# Patient Record
Sex: FEMALE | Race: WHITE | Employment: FULL TIME | URBAN - METROPOLITAN AREA
[De-identification: names, ages, dates, MRNs, and addresses within clinical notes are randomized per-mention and may not be internally consistent; named-entity substitution may affect disease eponyms.]

---

## 2018-11-08 ENCOUNTER — TELEPHONE (OUTPATIENT)
Dept: ONCOLOGY | Age: 53
End: 2018-11-08

## 2018-11-08 NOTE — TELEPHONE ENCOUNTER
New referral received via fax for Dr. July Michelle from Dr. Ilda Stiles (). Pt scheduled 11/26/18 @4p with Dr. July Michelle for consult. Pt insurance [employer] ([employer]) accepted per reg. Records scanned.

## 2018-11-26 ENCOUNTER — INITIAL CONSULT (OUTPATIENT)
Dept: ONCOLOGY | Age: 53
End: 2018-11-26
Payer: COMMERCIAL

## 2018-11-26 VITALS
WEIGHT: 183.13 LBS | BODY MASS INDEX: 36.92 KG/M2 | HEIGHT: 59 IN | TEMPERATURE: 98.2 F | HEART RATE: 104 BPM | SYSTOLIC BLOOD PRESSURE: 141 MMHG | DIASTOLIC BLOOD PRESSURE: 79 MMHG

## 2018-11-26 DIAGNOSIS — D89.2 PARAPROTEINEMIA: Primary | ICD-10-CM

## 2018-11-26 DIAGNOSIS — N18.30 ANEMIA OF CHRONIC RENAL FAILURE, STAGE 3 (MODERATE) (HCC): ICD-10-CM

## 2018-11-26 DIAGNOSIS — D63.1 ANEMIA OF CHRONIC RENAL FAILURE, STAGE 3 (MODERATE) (HCC): ICD-10-CM

## 2018-11-26 DIAGNOSIS — D47.2 MONOCLONAL GAMMOPATHY: ICD-10-CM

## 2018-11-26 PROCEDURE — 99245 OFF/OP CONSLTJ NEW/EST HI 55: CPT | Performed by: INTERNAL MEDICINE

## 2018-11-26 RX ORDER — SIMVASTATIN 20 MG
20 TABLET ORAL DAILY
COMMUNITY
Start: 2018-08-02

## 2018-11-26 RX ORDER — OMEPRAZOLE 20 MG/1
20 CAPSULE, DELAYED RELEASE ORAL DAILY
COMMUNITY
End: 2021-10-01 | Stop reason: ALTCHOICE

## 2018-11-26 RX ORDER — AMLODIPINE BESYLATE 5 MG/1
5 TABLET ORAL DAILY
Refills: 4 | COMMUNITY
Start: 2018-11-05 | End: 2018-12-10 | Stop reason: ALTCHOICE

## 2018-11-26 NOTE — LETTER
thrombocytopenia we recommend bone marrow test.  At the time of the procedure we will do further workup also including liver function and vitamin B12 and folate and iron studies. Patient may benefit from treatment with Aranesp for anemia of chronic renal insufficiency once other causes of anemia ruled out. Patient's questions were answered to the best of her satisfaction and she verbalized full understanding and agreement. If you have questions, please do not hesitate to call me. I look forward to following Che Guerra along with you. Sincerely,    Pedro Santizo MD  Cell: [phone]    This note is created with the assistance of a speech recognition program.  While intending to generate a document that actually reflects the content of the visit, the document can still have some errors including those of syntax and sound a like substitutions which may escape proof reading. It such instances, actual meaning can be extrapolated by contextual diversion.

## 2018-11-26 NOTE — PROGRESS NOTES
studies. Patient may benefit from treatment with Aranesp for anemia of chronic renal insufficiency once other causes of anemia ruled out. Patient's questions were answered to the best of her satisfaction and she verbalized full understanding and agreement.

## 2018-12-03 ENCOUNTER — HOSPITAL ENCOUNTER (OUTPATIENT)
Age: 53
Discharge: HOME | End: 2018-12-03
Payer: COMMERCIAL

## 2018-12-03 ENCOUNTER — OFFICE VISIT (OUTPATIENT)
Dept: ONCOLOGY | Age: 53
End: 2018-12-03
Payer: COMMERCIAL

## 2018-12-03 VITALS
WEIGHT: 184 LBS | SYSTOLIC BLOOD PRESSURE: 133 MMHG | TEMPERATURE: 97.7 F | BODY MASS INDEX: 37.16 KG/M2 | HEART RATE: 83 BPM | DIASTOLIC BLOOD PRESSURE: 91 MMHG

## 2018-12-03 DIAGNOSIS — N18.30 ANEMIA OF CHRONIC RENAL FAILURE, STAGE 3 (MODERATE) (HCC): ICD-10-CM

## 2018-12-03 DIAGNOSIS — D63.1 ANEMIA OF CHRONIC RENAL FAILURE, STAGE 3 (MODERATE) (HCC): ICD-10-CM

## 2018-12-03 DIAGNOSIS — D89.2 PARAPROTEINEMIA: ICD-10-CM

## 2018-12-03 DIAGNOSIS — D63.1 ANEMIA OF CHRONIC RENAL FAILURE, STAGE 3 (MODERATE) (HCC): Primary | ICD-10-CM

## 2018-12-03 DIAGNOSIS — N18.30 ANEMIA OF CHRONIC RENAL FAILURE, STAGE 3 (MODERATE) (HCC): Primary | ICD-10-CM

## 2018-12-03 DIAGNOSIS — D89.2 PARAPROTEINEMIA: Primary | ICD-10-CM

## 2018-12-03 LAB
ABSOLUTE EOS #: 0.1 K/UL (ref 0–0.4)
ABSOLUTE IMMATURE GRANULOCYTE: ABNORMAL K/UL (ref 0–0.3)
ABSOLUTE LYMPH #: 1.4 K/UL (ref 1–4.8)
ABSOLUTE MONO #: 0.4 K/UL (ref 0.1–1.2)
ABSOLUTE RETIC #: 0.06 M/UL (ref 0.03–0.08)
ALBUMIN SERPL-MCNC: 4.3 G/DL (ref 3.5–5.2)
ALBUMIN/GLOBULIN RATIO: 1.3 (ref 1–2.5)
ALP BLD-CCNC: 70 U/L (ref 35–104)
ALT SERPL-CCNC: 21 U/L (ref 5–33)
AST SERPL-CCNC: 16 U/L
BASOPHILS # BLD: 1 % (ref 0–2)
BASOPHILS ABSOLUTE: 0.1 K/UL (ref 0–0.2)
BILIRUB SERPL-MCNC: 0.18 MG/DL (ref 0.3–1.2)
BILIRUBIN DIRECT: <0.08 MG/DL
BILIRUBIN, INDIRECT: ABNORMAL MG/DL (ref 0–1)
DIFFERENTIAL TYPE: ABNORMAL
EOSINOPHILS RELATIVE PERCENT: 1 % (ref 1–4)
FERRITIN: 96 UG/L (ref 13–150)
FOLATE: 12.3 NG/ML
GLOBULIN: ABNORMAL G/DL (ref 1.5–3.8)
HCT VFR BLD CALC: 30.7 % (ref 36–46)
HEMOGLOBIN: 10.3 G/DL (ref 12–16)
IMMATURE GRANULOCYTES: ABNORMAL %
IMMATURE RETIC FRACT: 11.6 % (ref 2.7–18.3)
IRON SATURATION: 27 % (ref 20–55)
IRON: 80 UG/DL (ref 37–145)
LYMPHOCYTES # BLD: 22 % (ref 24–44)
MCH RBC QN AUTO: 32.6 PG (ref 26–34)
MCHC RBC AUTO-ENTMCNC: 33.6 G/DL (ref 31–37)
MCV RBC AUTO: 97 FL (ref 80–100)
MONOCYTES # BLD: 6 % (ref 2–11)
NRBC AUTOMATED: ABNORMAL PER 100 WBC
PDW BLD-RTO: 13 % (ref 12.5–15.4)
PLATELET # BLD: 271 K/UL (ref 140–450)
PLATELET ESTIMATE: ABNORMAL
PMV BLD AUTO: 7.6 FL (ref 6–12)
RBC # BLD: 3.16 M/UL (ref 4–5.2)
RBC # BLD: ABNORMAL 10*6/UL
RETIC %: 1.8 % (ref 0.5–1.9)
RETIC HEMOGLOBIN: 35 PG (ref 28.2–35.7)
SEG NEUTROPHILS: 70 % (ref 36–66)
SEGMENTED NEUTROPHILS ABSOLUTE COUNT: 4.6 K/UL (ref 1.8–7.7)
TOTAL IRON BINDING CAPACITY: 292 UG/DL (ref 250–450)
TOTAL PROTEIN: 7.7 G/DL (ref 6.4–8.3)
UNSATURATED IRON BINDING CAPACITY: 212 UG/DL (ref 112–347)
VITAMIN B-12: 741 PG/ML (ref 232–1245)
WBC # BLD: 6.6 K/UL (ref 3.5–11)
WBC # BLD: ABNORMAL 10*3/UL

## 2018-12-03 PROCEDURE — 85025 COMPLETE CBC W/AUTO DIFF WBC: CPT

## 2018-12-03 PROCEDURE — 88185 FLOWCYTOMETRY/TC ADD-ON: CPT

## 2018-12-03 PROCEDURE — 82728 ASSAY OF FERRITIN: CPT

## 2018-12-03 PROCEDURE — 38221 DX BONE MARROW BIOPSIES: CPT | Performed by: INTERNAL MEDICINE

## 2018-12-03 PROCEDURE — 83540 ASSAY OF IRON: CPT

## 2018-12-03 PROCEDURE — 38222 DX BONE MARROW BX & ASPIR: CPT | Performed by: INTERNAL MEDICINE

## 2018-12-03 PROCEDURE — 88184 FLOWCYTOMETRY/ TC 1 MARKER: CPT

## 2018-12-03 PROCEDURE — 85045 AUTOMATED RETICULOCYTE COUNT: CPT

## 2018-12-03 PROCEDURE — 88311 DECALCIFY TISSUE: CPT

## 2018-12-03 PROCEDURE — 36415 COLL VENOUS BLD VENIPUNCTURE: CPT

## 2018-12-03 PROCEDURE — 88280 CHROMOSOME KARYOTYPE STUDY: CPT

## 2018-12-03 PROCEDURE — 82607 VITAMIN B-12: CPT

## 2018-12-03 PROCEDURE — 88237 TISSUE CULTURE BONE MARROW: CPT

## 2018-12-03 PROCEDURE — 88360 TUMOR IMMUNOHISTOCHEM/MANUAL: CPT

## 2018-12-03 PROCEDURE — 88264 CHROMOSOME ANALYSIS 20-25: CPT

## 2018-12-03 PROCEDURE — 88305 TISSUE EXAM BY PATHOLOGIST: CPT

## 2018-12-03 PROCEDURE — 83550 IRON BINDING TEST: CPT

## 2018-12-03 PROCEDURE — 80076 HEPATIC FUNCTION PANEL: CPT

## 2018-12-03 PROCEDURE — 88313 SPECIAL STAINS GROUP 2: CPT

## 2018-12-03 PROCEDURE — 82746 ASSAY OF FOLIC ACID SERUM: CPT

## 2018-12-06 LAB — BONE MARROW REPORT: NORMAL

## 2018-12-07 LAB
CHROMOSOME STUDY: NORMAL
FLOW CYTOMETRY, BM: NORMAL

## 2018-12-10 ENCOUNTER — OFFICE VISIT (OUTPATIENT)
Dept: ONCOLOGY | Age: 53
End: 2018-12-10
Payer: COMMERCIAL

## 2018-12-10 VITALS
SYSTOLIC BLOOD PRESSURE: 144 MMHG | DIASTOLIC BLOOD PRESSURE: 92 MMHG | TEMPERATURE: 97.7 F | WEIGHT: 183.44 LBS | BODY MASS INDEX: 37.05 KG/M2 | HEART RATE: 95 BPM

## 2018-12-10 DIAGNOSIS — D89.2 PARAPROTEINEMIA: Primary | ICD-10-CM

## 2018-12-10 DIAGNOSIS — D63.1 ANEMIA OF CHRONIC RENAL FAILURE, STAGE 3 (MODERATE) (HCC): ICD-10-CM

## 2018-12-10 DIAGNOSIS — N18.30 ANEMIA OF CHRONIC RENAL FAILURE, STAGE 3 (MODERATE) (HCC): ICD-10-CM

## 2018-12-10 PROCEDURE — 99214 OFFICE O/P EST MOD 30 MIN: CPT | Performed by: INTERNAL MEDICINE

## 2018-12-10 PROCEDURE — 99211 OFF/OP EST MAY X REQ PHY/QHP: CPT | Performed by: INTERNAL MEDICINE

## 2018-12-10 RX ORDER — HYDROCHLOROTHIAZIDE 25 MG/1
25 TABLET ORAL DAILY
Refills: 1 | COMMUNITY
Start: 2018-12-03 | End: 2020-08-21

## 2018-12-11 NOTE — PROGRESS NOTES
Otherwise negative for any hematological or oncological conditions. SOCIAL HISTORY:  reports that she has quit smoking. She has never used smokeless tobacco. She reports that she does not drink alcohol or use drugs. REVIEW OF SYSTEMS:     · General: No weakness or fatigue. No unanticipated weight loss or decreased appetite. No fever or chills. · Eyes: No blurred vision, eye pain or double vision. · Ears: No hearing problems or drainage. No tinnitus. · Throat: No sore throat, problems with swallowing or dysphagia. · Respiratory: No cough, sputum or hemoptysis. No shortness of breath. No pleuritic chest pain. · Cardiovascular: No chest pain, orthopnea or PND. No lower extremity edema. No palpitation. · Gastrointestinal: No problems with swallowing. No abdominal pain or bloating. No nausea or vomiting. No diarrhea or constipation. No GI bleeding. · Genitourinary: No dysuria, hematuria, frequency or urgency. · Musculoskeletal: No muscle aches or pains. No limitation of movement. No back pain. No gait disturbance, No joint complaints. · Dermatologic: No skin rashes or pruritus. No skin lesions or discolorations. · Psychiatric: No depression, anxiety, or stress or signs of schizophrenia. No change in mood or affect. · Hematologic: No history of bleeding tendency. No bruises or ecchymosis. No history of clotting problems. · Infectious disease: No fever, chills or frequent infections. · Endocrine: No problems with opacity. No polydipsia or polyuria. No temperature intolerance. · Neurologic: No headaches or dizziness. No weakness or numbness of the extremities. No changes in balance, coordination,  memory, mentation, behavior. · Allergic/Immunologic: No nasal congestion or hives. No repeated infections. PHYSICAL EXAM:  The patient is not in acute distress. Vital signs: Blood pressure (!) 144/92, pulse 95, temperature 97.7 °F (36.5 °C), weight 183 lb 7 oz (83.2 kg).  HEENT:  Eyes

## 2019-06-18 ENCOUNTER — TELEPHONE (OUTPATIENT)
Dept: ONCOLOGY | Age: 54
End: 2019-06-18

## 2019-08-06 ENCOUNTER — TELEPHONE (OUTPATIENT)
Dept: ONCOLOGY | Age: 54
End: 2019-08-06

## 2019-08-09 ENCOUNTER — TELEPHONE (OUTPATIENT)
Dept: ONCOLOGY | Age: 54
End: 2019-08-09

## 2019-08-09 ENCOUNTER — OFFICE VISIT (OUTPATIENT)
Dept: ONCOLOGY | Age: 54
End: 2019-08-09
Payer: COMMERCIAL

## 2019-08-09 VITALS
TEMPERATURE: 98.6 F | HEART RATE: 118 BPM | WEIGHT: 199 LBS | DIASTOLIC BLOOD PRESSURE: 73 MMHG | SYSTOLIC BLOOD PRESSURE: 132 MMHG | BODY MASS INDEX: 40.19 KG/M2

## 2019-08-09 DIAGNOSIS — N18.30 ANEMIA OF CHRONIC RENAL FAILURE, STAGE 3 (MODERATE) (HCC): ICD-10-CM

## 2019-08-09 DIAGNOSIS — D89.2 PARAPROTEINEMIA: Primary | ICD-10-CM

## 2019-08-09 DIAGNOSIS — D63.1 ANEMIA OF CHRONIC RENAL FAILURE, STAGE 3 (MODERATE) (HCC): ICD-10-CM

## 2019-08-09 PROCEDURE — 99214 OFFICE O/P EST MOD 30 MIN: CPT | Performed by: INTERNAL MEDICINE

## 2019-08-09 PROCEDURE — 99211 OFF/OP EST MAY X REQ PHY/QHP: CPT | Performed by: INTERNAL MEDICINE

## 2019-08-09 RX ORDER — SODIUM BICARBONATE 325 MG/1
325 TABLET ORAL 2 TIMES DAILY
Refills: 2 | COMMUNITY
Start: 2019-07-31

## 2019-08-09 RX ORDER — AMLODIPINE BESYLATE 5 MG/1
5 TABLET ORAL DAILY
COMMUNITY
Start: 2019-06-25 | End: 2020-02-17 | Stop reason: ALTCHOICE

## 2019-08-11 NOTE — PROGRESS NOTES
Diagnosis   Peripheral Blood:         Normocytic anemia, hypoproliferative. Bone Marrow Aspirate, Clot Section and Biopsy:         Maturing trilineage hematopoiesis, normal cellular bone marrow.         Adequate storage iron.         Plasma cells, monoclonal, 3% (lambda).         Diagnosis Comment     Electronic health record is reviewed.  Noted in the clinical   information is a faint IGA lambda paraprotein.  Quantitation is not   available within the EHR.  Recommend serum and urine protein   electrophrenic studies with immunofixation if not already performed   for quantitation of paraprotein.        IMPRESSION:   Currently Normocytic anemia  Past Chronic history of macrocytic anemia  Paraproteinemia, likely MGUS  Chronic renal insufficiency, stage IV      PLAN: All above were reviewed and discussed with the patient. I explained the significance of these abnormalities related to immunoglobulins. Bone marrow test was reviewed and discussed. MGUS. repeated labs showed stable immunoglobulins level. No significant changes. We recommended to continue monitoring for IgA and light chain immunoglobulins. Labs will be repeated in 6 months. Patient may benefit from treatment with Aranesp for anemia of chronic renal insufficiency for hemoglobin below 10. Patient's questions were answered to the best of her satisfaction and she verbalized full understanding and agreement.

## 2020-02-11 ENCOUNTER — TELEPHONE (OUTPATIENT)
Dept: ONCOLOGY | Age: 55
End: 2020-02-11

## 2020-02-17 ENCOUNTER — OFFICE VISIT (OUTPATIENT)
Dept: ONCOLOGY | Age: 55
End: 2020-02-17
Payer: COMMERCIAL

## 2020-02-17 ENCOUNTER — TELEPHONE (OUTPATIENT)
Dept: ONCOLOGY | Age: 55
End: 2020-02-17

## 2020-02-17 VITALS
WEIGHT: 199.2 LBS | TEMPERATURE: 97.7 F | DIASTOLIC BLOOD PRESSURE: 95 MMHG | HEART RATE: 108 BPM | SYSTOLIC BLOOD PRESSURE: 132 MMHG | BODY MASS INDEX: 40.23 KG/M2

## 2020-02-17 PROCEDURE — 99214 OFFICE O/P EST MOD 30 MIN: CPT | Performed by: INTERNAL MEDICINE

## 2020-02-17 PROCEDURE — 99211 OFF/OP EST MAY X REQ PHY/QHP: CPT | Performed by: INTERNAL MEDICINE

## 2020-02-17 RX ORDER — RAMIPRIL 2.5 MG/1
CAPSULE ORAL
COMMUNITY
Start: 2019-12-11 | End: 2021-10-01 | Stop reason: ALTCHOICE

## 2020-02-17 NOTE — TELEPHONE ENCOUNTER
Pt was seen today by Dr. Rm Duarte. Per AVS, pt is scheduled for f/u on 08-17-20/pt will have labs drawn at [address] one week prior (pt has orders).

## 2020-02-18 NOTE — PROGRESS NOTES
liver cancer. Otherwise negative for any hematological or oncological conditions. SOCIAL HISTORY:  reports that she has quit smoking. She has never used smokeless tobacco. She reports that she does not drink alcohol or use drugs. REVIEW OF SYSTEMS:     · General: No weakness or fatigue. No unanticipated weight loss or decreased appetite. No fever or chills. · Eyes: No blurred vision, eye pain or double vision. · Ears: No hearing problems or drainage. No tinnitus. · Throat: No sore throat, problems with swallowing or dysphagia. · Respiratory: No cough, sputum or hemoptysis. No shortness of breath. No pleuritic chest pain. · Cardiovascular: No chest pain, orthopnea or PND. No lower extremity edema. No palpitation. · Gastrointestinal: No problems with swallowing. No abdominal pain or bloating. No nausea or vomiting. No diarrhea or constipation. No GI bleeding. · Genitourinary: No dysuria, hematuria, frequency or urgency. · Musculoskeletal: No muscle aches or pains. No limitation of movement. No back pain. No gait disturbance, No joint complaints. · Dermatologic: No skin rashes or pruritus. No skin lesions or discolorations. · Psychiatric: No depression, anxiety, or stress or signs of schizophrenia. No change in mood or affect. · Hematologic: No history of bleeding tendency. No bruises or ecchymosis. No history of clotting problems. · Infectious disease: No fever, chills or frequent infections. · Endocrine: No problems with opacity. No polydipsia or polyuria. No temperature intolerance. · Neurologic: No headaches or dizziness. No weakness or numbness of the extremities. No changes in balance, coordination,  memory, mentation, behavior. · Allergic/Immunologic: No nasal congestion or hives. No repeated infections. PHYSICAL EXAM:  The patient is not in acute distress.   Vital signs: Blood pressure (!) 132/95, pulse 108, temperature 97.7 °F (36.5 °C), temperature source Oral, 2018:  Final Diagnosis   Peripheral Blood:         Normocytic anemia, hypoproliferative. Bone Marrow Aspirate, Clot Section and Biopsy:         Maturing trilineage hematopoiesis, normal cellular bone marrow.         Adequate storage iron.         Plasma cells, monoclonal, 3% (lambda).         Diagnosis Comment     Electronic health record is reviewed.  Noted in the clinical   information is a faint IGA lambda paraprotein.  Quantitation is not   available within the EHR.  Recommend serum and urine protein   electrophrenic studies with immunofixation if not already performed   for quantitation of paraprotein.        IMPRESSION:   Past Chronic history of macrocytic anemia  Paraproteinemia, likely MGUS  Chronic renal insufficiency, stage IV      PLAN: All above were reviewed and discussed with the patient. I explained the significance of these abnormalities related to immunoglobulins. Bone marrow test was reviewed and discussed. MGUS. repeated labs showed stable immunoglobulins level. No significant changes. We recommended to continue monitoring for IgA and light chain immunoglobulins. Labs will be repeated in 6 months. Patient may benefit from treatment with Aranesp for anemia of chronic renal insufficiency for hemoglobin below 10. Patient's questions were answered to the best of her satisfaction and she verbalized full understanding and agreement.

## 2020-08-10 ENCOUNTER — TELEPHONE (OUTPATIENT)
Dept: ONCOLOGY | Age: 55
End: 2020-08-10

## 2020-08-10 NOTE — TELEPHONE ENCOUNTER
Tried calling patient to see if she had her labs done for her appt with Jules Kellogg on 8/17. Only CBC and CMP were done. Patient needs more labs done besides those. Patient did not have have voicemail, will try calling patient again.

## 2020-08-17 ENCOUNTER — TELEPHONE (OUTPATIENT)
Dept: ONCOLOGY | Age: 55
End: 2020-08-17

## 2020-08-17 NOTE — LETTER
[address]  Phone: [phone]  Fax: [phone]    Cinda Justin MD        August 17, 2020     [address] [place], [place] [address] [place] [address]      Dear Cecil Gaspar: We are sorry that you missed your appointment with Dr. Jackie Naqvi on 8/17/2020. Your health and follow-up medical care are important to us. Please call our office as soon as possible so that we may reschedule your appointment. If you have already rescheduled your appointment, please disregard this letter.     Sincerely,        Cinda Justin MD

## 2020-08-21 ENCOUNTER — TELEPHONE (OUTPATIENT)
Dept: ONCOLOGY | Age: 55
End: 2020-08-21

## 2020-08-21 ENCOUNTER — OFFICE VISIT (OUTPATIENT)
Dept: ONCOLOGY | Age: 55
End: 2020-08-21
Payer: COMMERCIAL

## 2020-08-21 VITALS
DIASTOLIC BLOOD PRESSURE: 86 MMHG | BODY MASS INDEX: 38.56 KG/M2 | HEART RATE: 121 BPM | RESPIRATION RATE: 18 BRPM | WEIGHT: 190.9 LBS | TEMPERATURE: 97.3 F | SYSTOLIC BLOOD PRESSURE: 135 MMHG

## 2020-08-21 PROCEDURE — 99211 OFF/OP EST MAY X REQ PHY/QHP: CPT | Performed by: INTERNAL MEDICINE

## 2020-08-21 PROCEDURE — 99214 OFFICE O/P EST MOD 30 MIN: CPT | Performed by: INTERNAL MEDICINE

## 2020-08-21 NOTE — TELEPHONE ENCOUNTER
Pt was seen today by Dr. Kristina Patel. Per AVS, pt is scheduled for f/u on 02-. Pt will have labs drawn one week prior here at [facility].  Specific labs not stated on AVS.

## 2020-08-31 NOTE — PROGRESS NOTES
_           Chief Complaint   Patient presents with    Follow-up     review status of disease    Results     DIAGNOSIS:       Currently Normocytic anemia  Past Chronic history of macrocytic anemia  Paraproteinemia, likely MGUS  Chronic renal insufficiency, stage IV      CURRENT THERAPY:         Patient seen discuss results of the lab test and further lab work up  Close monitoring for MGUS    BRIEF CASE HISTORY:      Ms. Gracie Ann is a very pleasant 54 y.o. female with history of hyperlipidemia and hypertension diagnosed more than 15 years ago. Patient was also found to have chronic renal insufficiency. Kidney function was deteriorating. She was recently referred and evaluated by nephrology. Further workup for underlying etiology showed abnormal immunoglobulins levels. She is referred for further evaluation. Patient has no fever or night sweats. No back pain or bone aches. No weight loss or decreased appetite. No palpable lymph nodes. No history of liver disease. No other complaints. Patient quit smoking 25 years ago. He smoked about 10 years. Denies alcohol drinking. INTERIM HISTORY:   The patient seen for follow-up abnormal immunoglobulins levels related to MGUS based on Bone marrow test.  Clinically patient is stable. No weakness or fatigue. No fever or night sweats. No weight loss or decreased appetite. No palpable lymph nodes. PAST MEDICAL HISTORY: has a past medical history of Hyperlipidemia and Hypertension. PAST SURGICAL HISTORY: has a past surgical history that includes Bunionectomy (Bilateral). CURRENT MEDICATIONS:  has a current medication list which includes the following prescription(s): ramipril, sodium bicarbonate, simvastatin, and omeprazole. ALLERGIES:  is allergic to ampicillin. FAMILY HISTORY: Grandfather had lung cancer. Grandfather had liver cancer.   Otherwise negative for any hematological or oncological conditions. SOCIAL HISTORY:  reports that she has quit smoking. She has never used smokeless tobacco. She reports that she does not drink alcohol or use drugs. REVIEW OF SYSTEMS:     · General: No weakness or fatigue. No unanticipated weight loss or decreased appetite. No fever or chills. · Eyes: No blurred vision, eye pain or double vision. · Ears: No hearing problems or drainage. No tinnitus. · Throat: No sore throat, problems with swallowing or dysphagia. · Respiratory: No cough, sputum or hemoptysis. No shortness of breath. No pleuritic chest pain. · Cardiovascular: No chest pain, orthopnea or PND. No lower extremity edema. No palpitation. · Gastrointestinal: No problems with swallowing. No abdominal pain or bloating. No nausea or vomiting. No diarrhea or constipation. No GI bleeding. · Genitourinary: No dysuria, hematuria, frequency or urgency. · Musculoskeletal: No muscle aches or pains. No limitation of movement. No back pain. No gait disturbance, No joint complaints. · Dermatologic: No skin rashes or pruritus. No skin lesions or discolorations. · Psychiatric: No depression, anxiety, or stress or signs of schizophrenia. No change in mood or affect. · Hematologic: No history of bleeding tendency. No bruises or ecchymosis. No history of clotting problems. · Infectious disease: No fever, chills or frequent infections. · Endocrine: No problems with opacity. No polydipsia or polyuria. No temperature intolerance. · Neurologic: No headaches or dizziness. No weakness or numbness of the extremities. No changes in balance, coordination,  memory, mentation, behavior. · Allergic/Immunologic: No nasal congestion or hives. No repeated infections. PHYSICAL EXAM:  The patient is not in acute distress. Vital signs: Blood pressure 135/86, pulse 121, temperature 97.3 °F (36.3 °C), temperature source Oral, resp.  rate 18, weight 190 lb 14.4 oz (86.6 kg). HEENT:  Eyes are normal. Ears, nose and throat are normal.  Neck: Supple. No lymph node enlargement. No thyroid enlargement. Trachea is centrally located. Chest:  Clear to auscultation. No wheezes or crepitations. Heart: Regular sinus rhythm. Abdomen: Soft, nontender. No hepatosplenomegaly. No masses. Extremities:  With no edema. Lymph Nodes:  No cervical, axillary or inguinal lymph node enlargement. Neurologic:  Conscious and oriented. No focal neurological deficits. Psychosocial: No depression, anxiety or stress. Skin: No rashes, bruises or ecchymoses. Review of Diagnostic data:     CBC  Component Name    Hospital Encounter on 10/29/2018   [employer]\")' href=\"epic://request1. [id number]/\">10/29/2018   Hospital Encounter on 8/31/2018   [employer]\")' href=\"epic://request1. [id number]/\">0/26/9784   Hospital Encounter on 8/2/2018   [employer]\")' href=\"epic://request1. [id number]/\">8/2/2018 1/4/2013 12/5/2012 12/1/2012     7.9 9.1 6.9 2.7 (L) 2.5 (L) 3.1 (L)   3.01 (L) 3.09 (L) 3.37 (L) 2.87 (L) 2.99 (L) 2.90 (L)   9.9 (L) 10.8 (L) 11.8 10.9 (L) 11.1 (L) 10.7 (L)   30.0 (L) 32.0 (L) 35.2 30.9 (L) 32.1 (L) 31.3 (L)   100 104 (H) 104 (H) 108 (H) 107 (H) 108 (H)   33.0 35.1 (H) 34.9 (H) 37.9 (H) 37.0 (H) 37.0 (H)   33.2 33.9 33.4 35.2 34.4 34.2   12.8 13.4 13.5 13.9 14.1 13.7   263 273 268 123 (L) 149 (L) 161   9.6 9.1 9.4 8.1 7.7     IMMUNOGLOBULINS  Component Name    Hospital Encounter on 10/29/2018  [address] [place] [address]\")' href=\"epic://request1. [id number]/\">10/29/2018     540 (H)   66   1,036   5.54 (H)   11.43 (H)   0.48   SEE SEPARATE REPORT   IgA   IgM   IgG   Free Kappa Lt Chains   Free Lambda Lt Chains   Free verona/lambda ratio   Immune profile inter     Bone marrow test December 3, 2018:   Final Diagnosis

## 2021-03-15 ENCOUNTER — TELEPHONE (OUTPATIENT)
Dept: ONCOLOGY | Age: 56
End: 2021-03-15

## 2021-03-15 ENCOUNTER — OFFICE VISIT (OUTPATIENT)
Dept: ONCOLOGY | Age: 56
End: 2021-03-15
Payer: COMMERCIAL

## 2021-03-15 VITALS
TEMPERATURE: 98.4 F | DIASTOLIC BLOOD PRESSURE: 90 MMHG | WEIGHT: 193.9 LBS | RESPIRATION RATE: 16 BRPM | HEART RATE: 109 BPM | BODY MASS INDEX: 39.16 KG/M2 | SYSTOLIC BLOOD PRESSURE: 143 MMHG

## 2021-03-15 DIAGNOSIS — D47.2 MGUS (MONOCLONAL GAMMOPATHY OF UNKNOWN SIGNIFICANCE): Primary | ICD-10-CM

## 2021-03-15 PROCEDURE — 99211 OFF/OP EST MAY X REQ PHY/QHP: CPT | Performed by: INTERNAL MEDICINE

## 2021-03-15 PROCEDURE — 99214 OFFICE O/P EST MOD 30 MIN: CPT | Performed by: INTERNAL MEDICINE

## 2021-03-15 RX ORDER — ERGOCALCIFEROL 1.25 MG/1
50000 CAPSULE ORAL WEEKLY
COMMUNITY

## 2021-03-15 RX ORDER — BUMETANIDE 0.5 MG/1
1 TABLET ORAL DAILY
COMMUNITY
Start: 2021-03-03

## 2021-03-15 NOTE — TELEPHONE ENCOUNTER
AVS from 3/15/21     RV 6 months with labs before RV    rv scheduled for 9/27/21 @ 2:30pm  Pt will have labs drawn one week prior at [facility] (cbc,bmp,immunoglobulin panel,kappa/lamda,ifx)    Pt was given AVS and appt schedule

## 2021-03-15 NOTE — PROGRESS NOTES
_           Chief Complaint   Patient presents with    Follow-up     review status of disease    Nausea     today    Fatigue    Anorexia     with the nausea     DIAGNOSIS:       Currently Normocytic anemia  Past Chronic history of macrocytic anemia  Paraproteinemia, likely MGUS  Chronic renal insufficiency, stage IV      CURRENT THERAPY:         Patient seen discuss results of the lab test and further lab work up  Close monitoring for MGUS    BRIEF CASE HISTORY:      Ms. Ishmael Angel is a very pleasant 64 y.o. female with history of hyperlipidemia and hypertension diagnosed more than 15 years ago. Patient was also found to have chronic renal insufficiency. Kidney function was deteriorating. She was recently referred and evaluated by nephrology. Further workup for underlying etiology showed abnormal immunoglobulins levels. She is referred for further evaluation. Patient has no fever or night sweats. No back pain or bone aches. No weight loss or decreased appetite. No palpable lymph nodes. No history of liver disease. No other complaints. Patient quit smoking 25 years ago. He smoked about 10 years. Denies alcohol drinking. INTERIM HISTORY:   The patient seen for follow-up abnormal immunoglobulins levels related to MGUS based on Bone marrow test.  Clinically patient is stable. No weakness or fatigue. No fever or night sweats. No weight loss or decreased appetite. No palpable lymph nodes. PAST MEDICAL HISTORY: has a past medical history of Hyperlipidemia and Hypertension. PAST SURGICAL HISTORY: has a past surgical history that includes Bunionectomy (Bilateral). CURRENT MEDICATIONS:  has a current medication list which includes the following prescription(s): vitamin d, bumetanide, ramipril, sodium bicarbonate, simvastatin, and omeprazole. ALLERGIES:  is allergic to ampicillin.     FAMILY HISTORY: Grandfather had lung cancer. Grandfather had liver cancer. Otherwise negative for any hematological or oncological conditions. SOCIAL HISTORY:  reports that she has quit smoking. She has never used smokeless tobacco. She reports that she does not drink alcohol or use drugs. REVIEW OF SYSTEMS:     · General: No weakness or fatigue. No unanticipated weight loss or decreased appetite. No fever or chills. · Eyes: No blurred vision, eye pain or double vision. · Ears: No hearing problems or drainage. No tinnitus. · Throat: No sore throat, problems with swallowing or dysphagia. · Respiratory: No cough, sputum or hemoptysis. No shortness of breath. No pleuritic chest pain. · Cardiovascular: No chest pain, orthopnea or PND. No lower extremity edema. No palpitation. · Gastrointestinal: No problems with swallowing. No abdominal pain or bloating. No nausea or vomiting. No diarrhea or constipation. No GI bleeding. · Genitourinary: No dysuria, hematuria, frequency or urgency. · Musculoskeletal: No muscle aches or pains. No limitation of movement. No back pain. No gait disturbance, No joint complaints. · Dermatologic: No skin rashes or pruritus. No skin lesions or discolorations. · Psychiatric: No depression, anxiety, or stress or signs of schizophrenia. No change in mood or affect. · Hematologic: No history of bleeding tendency. No bruises or ecchymosis. No history of clotting problems. · Infectious disease: No fever, chills or frequent infections. · Endocrine: No problems with opacity. No polydipsia or polyuria. No temperature intolerance. · Neurologic: No headaches or dizziness. No weakness or numbness of the extremities. No changes in balance, coordination,  memory, mentation, behavior. · Allergic/Immunologic: No nasal congestion or hives. No repeated infections. PHYSICAL EXAM:  The patient is not in acute distress.   Vital signs: Blood pressure (!) 143/90, pulse 109, temperature 98.4 °F (36.9 °C), temperature source Oral, resp. rate 16, weight 193 lb 14.4 oz (88 kg). HEENT:  Eyes are normal. Ears, nose and throat are normal.  Neck: Supple. No lymph node enlargement. No thyroid enlargement. Trachea is centrally located. Chest:  Clear to auscultation. No wheezes or crepitations. Heart: Regular sinus rhythm. Abdomen: Soft, nontender. No hepatosplenomegaly. No masses. Extremities:  With no edema. Lymph Nodes:  No cervical, axillary or inguinal lymph node enlargement. Neurologic:  Conscious and oriented. No focal neurological deficits. Psychosocial: No depression, anxiety or stress. Skin: No rashes, bruises or ecchymoses. Review of Diagnostic data:     CBC  Component Name    Hospital Encounter on 10/29/2018  [address] [place] [address] [facility]\")' href=\"epic://request1. [id number]/\">10/29/2018   Hospital Encounter on 8/31/2018   [employer] [place] [facility]\")' href=\"epic://request1. [id number]/\">6/61/2654   [facility]ospital En[facility]2/2018   [facility] [place] [facility]\")' href=\"epic://request1. [id number]/\">8/2/2018 1/4/2013 12/5/2012 12/1/2012     7.9 9.1 6.9 2.7 (L) 2.5 (L) 3.1 (L)   3.01 (L) 3.09 (L) 3.37 (L) 2.87 (L) 2.99 (L) 2.90 (L)   9.9 (L) 10.8 (L) 11.8 10.9 (L) 11.1 (L) 10.7 (L)   30.0 (L) 32.0 (L) 35.2 30.9 (L) 32.1 (L) 31.3 (L)   100 104 (H) 104 (H) 108 (H) 107 (H) 108 (H)   33.0 35.1 (H) 34.9 (H) 37.9 (H) 37.0 (H) 37.0 (H)   33.2 33.9 33.4 35.2 34.4 34.2   12.8 13.4 13.5 13.9 14.1 13.7   263 273 268 123 (L) 149 (L) 161   9.6 9.1 9.4 8.1 7.7     IMMUNOGLOBULINS  Component Name    [facility]2018   [facility]")' href=\"epic://request1. [id number]/\">10/29/2018     540 (H)   66   1,036   5.54 (H)   11.43 (H)   0.48   SEE SEPARATE REPORT   IgA   IgM   IgG   Free Kappa Lt Chains   Free Lambda Lt Chains   Free verona/lambda ratio   Immune profile inter     Bone marrow test December 3, 2018: Final Diagnosis   Peripheral Blood:         Normocytic anemia, hypoproliferative. Bone Marrow Aspirate, Clot Section and Biopsy:         Maturing trilineage hematopoiesis, normal cellular bone marrow.         Adequate storage iron.         Plasma cells, monoclonal, 3% (lambda).         Diagnosis Comment     Electronic health record is reviewed.  Noted in the clinical   information is a faint IGA lambda paraprotein.  Quantitation is not   available within the EHR.  Recommend serum and urine protein   electrophrenic studies with immunofixation if not already performed   for quantitation of paraprotein.        IMPRESSION:   Past Chronic history of macrocytic anemia  Paraproteinemia, likely MGUS  Chronic renal insufficiency, stage IV      PLAN: All above were reviewed and discussed with the patient. I explained the significance of these abnormalities related to immunoglobulins. Bone marrow test was reviewed and discussed. MGUS. repeated labs showed stable immunoglobulins level. No significant changes. We recommended to continue monitoring for IgA and light chain immunoglobulins. Labs will be repeated in 6 months. Patient may benefit from treatment with Aranesp for anemia of chronic renal insufficiency for hemoglobin below 10. Patient's questions were answered to the best of her satisfaction and she verbalized full understanding and agreement.

## 2021-09-23 DIAGNOSIS — N18.31 ANEMIA OF CHRONIC RENAL FAILURE, STAGE 3A (HCC): Primary | ICD-10-CM

## 2021-09-23 DIAGNOSIS — D63.1 ANEMIA OF CHRONIC RENAL FAILURE, STAGE 3A (HCC): Primary | ICD-10-CM

## 2021-10-01 ENCOUNTER — APPOINTMENT (OUTPATIENT)
Dept: ULTRASOUND IMAGING | Age: 56
End: 2021-10-01
Payer: COMMERCIAL

## 2021-10-01 ENCOUNTER — HOSPITAL ENCOUNTER (EMERGENCY)
Age: 56
Discharge: HOME | End: 2021-10-01
Attending: EMERGENCY MEDICINE
Payer: COMMERCIAL

## 2021-10-01 VITALS
SYSTOLIC BLOOD PRESSURE: 122 MMHG | DIASTOLIC BLOOD PRESSURE: 88 MMHG | OXYGEN SATURATION: 100 % | WEIGHT: 193 LBS | RESPIRATION RATE: 16 BRPM | HEIGHT: 59 IN | HEART RATE: 91 BPM | TEMPERATURE: 97.8 F | BODY MASS INDEX: 38.91 KG/M2

## 2021-10-01 DIAGNOSIS — M79.661 RIGHT CALF PAIN: Primary | ICD-10-CM

## 2021-10-01 PROCEDURE — 93971 EXTREMITY STUDY: CPT

## 2021-10-01 PROCEDURE — 99284 EMERGENCY DEPT VISIT MOD MDM: CPT

## 2021-10-01 PROCEDURE — 93970 EXTREMITY STUDY: CPT

## 2021-10-01 RX ORDER — ASPIRIN 81 MG/1
81 TABLET ORAL DAILY
COMMUNITY

## 2021-10-01 RX ORDER — FUROSEMIDE 20 MG/1
20 TABLET ORAL DAILY
COMMUNITY
End: 2021-11-12 | Stop reason: ALTCHOICE

## 2021-10-01 ASSESSMENT — ENCOUNTER SYMPTOMS
GASTROINTESTINAL NEGATIVE: 1
RESPIRATORY NEGATIVE: 1
EYES NEGATIVE: 1

## 2021-10-02 NOTE — ED PROVIDER NOTES
[id number] [facility] ED  [address] [facility] [address] [facility] [address]  Phone: [phone]  Fax: [phone]      Attending Physician Attestation    I performed a history and physical examination of the patient and discussed management with the mid level provideer. I reviewed the mid level provider's note and agree with the documented findings and plan of care. Any areas of disagreement are noted on the chart. I was personally present for the key portions of any procedures. I have documented in the chart those procedures where I was not present during the key portions. I have reviewed the emergency nurses triage note. I agree with the chief complaint, past medical history, past surgical history, allergies, medications, social and family history as documented unless otherwise noted below. Documentation of the HPI, Physical Exam and Medical Decision Making performed by mid level providers is based on my personal performance of the HPI, PE and MDM. For Physician Assistant/ Nurse Practitioner cases/documentation I have personally evaluated this patient and have completed at least one if not all key elements of the E/M (history, physical exam, and MDM). Additional findings are as noted. CHIEF COMPLAINT       Chief Complaint   Patient presents with    Leg Pain     Pt c/o right calf pain and AMS, per pt family. Pt was recently admitted to [address] [place] [address] Altered Mental Status         PAST MEDICAL HISTORY    has a past medical history of Chronic kidney disease, Hyperlipidemia, Hypertension, Hyponatremia, and Vitamin D deficiency. SURGICAL HISTORY      has a past surgical history that includes Bunionectomy (Bilateral).     CURRENT MEDICATIONS       Discharge Medication List as of 10/1/2021 10:41 PM      CONTINUE these medications which have NOT CHANGED    Details   Cholecalciferol 1.25 MG (84257 UT) TABS Take 50,000 Units by mouth nightlyHistorical Med      aspirin 81 MG EC tablet Take 81 mg by mouth dailyHistorical Med      furosemide (LASIX) 20 MG tablet Take 20 mg by mouth dailyHistorical Med      vitamin D (ERGOCALCIFEROL) 1.25 MG (05560 UT) CAPS capsule Take 50,000 Units by mouth once a weekHistorical Med      bumetanide (BUMEX) 0.5 MG tablet Take 1 tablet by mouth dailyHistorical Med      sodium bicarbonate 325 MG tablet Take 325 mg by mouth 2 times daily, R-2Historical Med      simvastatin (ZOCOR) 20 MG tablet Take 20 mg by mouth dailyHistorical Med             ALLERGIES     is allergic to ampicillin. FAMILY HISTORY     She indicated that her mother is . She indicated that her father is . She indicated that her maternal grandfather is . She indicated that her paternal grandfather is . family history includes Cancer in her paternal grandfather; High Blood Pressure in her mother; Kidney Disease in her mother; [place] Soles in her maternal grandfather. SOCIAL HISTORY      reports that she has quit smoking. She has never used smokeless tobacco. She reports that she does not drink alcohol and does not use drugs. DIAGNOSTIC RESULTS     EKG: All EKG's are interpreted by the Emergency Department Physician who either signs or Co-signs this chart in the absence of a cardiologist.      RADIOLOGY:   Non-plain film images such as CT, Ultrasound and MRI are read by the radiologist. Plain radiographic images are visualized and the radiologist interpretations are reviewed as follows:     US DUP LOWER EXTREMITY RIGHT DESEAN   Final Result   No evidence of DVT in the right lower extremity. US DUP LOWER EXTREMITY LEFT DESEAN   Final Result   No evidence of DVT in the left lower extremity. No results found. LABS:  No results found for this visit on 10/01/21.       EMERGENCY DEPARTMENT COURSE:   Vitals:    Vitals:    10/01/21 1955 10/01/21 2006 10/01/21 2021 10/01/21 2051   BP: (!) 136/99 125/84 (!) 125/92 122/88   Pulse: 91      Resp: 16      Temp: 97.8 °F (36.6 °C)      TempSrc: Oral      SpO2: 100% 100% 100% 100%   Weight: 87.5 kg (193 lb)      Height: 4' 11\" (1.499 m)        -------------------------  BP: 122/88, Temp: 97.8 °F (36.6 °C), Pulse: 91, Resp: 16      PERTINENT ATTENDING PHYSICIAN COMMENTS:    Patient presents with right calf pain. Was just recently admitted to [facility] and had fairly extensive neurologic work-up. Was told she has a TIA. She is recovering and improving and that seems to be at baseline. They are here primarily because of the right calf pain concerned about a DVT. She is on baby aspirin daily. She does have a follow-up appoint with her neurologist this month. No other new or concerning symptoms. She does have mild right calf tenderness but no appreciable edema compared to the left. Plan to be to ultrasound the leg and disposition accordingly. Dopplers are negative. Clinically she appears well and otherwise nontoxic or septic. I feel she is appropriate for discharge and outpatient follow-up. Advised to follow-up with her PCP return right away if worsening or for new or concerning symptoms. She is comfortable with this plan. The patient understands that at this time there is no evidence for a more malignant underlying process, but the patient also understands that early in the process of an illness or injury, an emergency department workup can be falsely reassuring. Routine discharge counseling was given, and the patient understands that worsening, changing or persistent symptoms should prompt an immediate call or follow up with their primary physician or return to the emergency department. The importance of appropriate follow up was also discussed. I have reviewed the disposition diagnosis with the patient and or their family/guardian. I have answered their questions and given discharge instructions.   They voiced understanding of these instructions and did not have any further questions or complaints. CONSULTS:    None    CRITICAL CARE:     None    PROCEDURES:    None    FINAL IMPRESSION      1.  Right calf pain          DISPOSITION/PLAN   DISPOSITION Decision To Discharge 10/01/2021 10:39:57 PM      Condition on Disposition    Improved    PATIENT REFERRED TO:  Jurgen Garzon  [address] [place] [address], [id number]  Kashif Murillo [address]  [phone]    Schedule an appointment as soon as possible for a visit in 2 days        DISCHARGE MEDICATIONS:  Discharge Medication List as of 10/1/2021 10:41 PM          (Please note that portions of this note were completed with a voice recognition program.  Efforts were made to edit the dictations but occasionally words are mis-transcribed.)    Mayelin Martin DO, DO  Attending Emergency Physician       Mayelin Martin DO  10/02/21 0021

## 2021-10-02 NOTE — ED PROVIDER NOTES
[address] [facility] ED  [address] [address] [facility] [address] [facility] [address]  Phone: [phone]  Fax: [phone]        Pt Name: Tera Ayala  MRN: [id number]  [place] 1965  Date of evaluation: 10/1/21    [address] [place] [address]       Chief Complaint   Patient presents with    Leg Pain     Pt c/o right calf pain and AMS, per pt family. Pt was recently admitted to [address] [place] [address] Altered Mental Status       HISTORY OF PRESENT ILLNESS (Location/Symptom, Timing/Onset, Context/Setting, Quality, Duration, Modifying Factors, Severity)      Tera Ayala is a 64 y.o. female  who presents to the ED via private auto with right calf pain that has been ongoing since Wednesday after the patient was discharged from [facility]. On Monday the patient started having altered mental status with amnesia and was taken to [facility] where she was admitted for neurology work-up and was discharged on Wednesday. Vomited she had a EEG, MRI, EKG, and cardiac echo which all came back normal. She was diagnosed with [employer] amnesia and TIA.  states since Wednesday she has gradually improved began to eat more and become more independent But states that today she been complaining of right lower calf pain with tenderness. She denies any falls or traumas recently and has been taking her medication as prescribed. PAST MEDICAL / SURGICAL / SOCIAL / FAMILY HISTORY     PMH:  has a past medical history of Chronic kidney disease, Hyperlipidemia, Hypertension, Hyponatremia, and Vitamin D deficiency. Surgical History:  has a past surgical history that includes Bunionectomy (Bilateral). Social History:  reports that she has quit smoking. She has never used smokeless tobacco. She reports that she does not drink alcohol and does not use drugs. Family History: She indicated that her mother is . She indicated that her father is . She indicated that her maternal grandfather is . A quick search on [employer] revealed [employer] product. There are not as many options as the once were unfortunately. It is possible that another website many provide more options.   HENT:      Head: Normocephalic and atraumatic. Mouth/Throat:      Mouth: Mucous membranes are moist.      Pharynx: Oropharynx is clear. Eyes:      Extraocular Movements: Extraocular movements intact. Pupils: Pupils are equal, round, and reactive to light. Cardiovascular:      Rate and Rhythm: Normal rate and regular rhythm. Pulses:           Dorsalis pedis pulses are 3+ on the right side and 3+ on the left side. Heart sounds: Normal heart sounds. Pulmonary:      Effort: Pulmonary effort is normal.      Breath sounds: Normal breath sounds. Abdominal:      Palpations: Abdomen is soft. Musculoskeletal:      Cervical back: Normal range of motion and neck supple. Right lower leg: Tenderness present. No edema. Left lower leg: No edema. Legs:    Skin:     General: Skin is warm and dry. Neurological:      Mental Status: She is alert and oriented to person, place, and time. GCS: GCS eye subscore is 4. GCS verbal subscore is 5. GCS motor subscore is 6. Cranial Nerves: No cranial nerve deficit, dysarthria or facial asymmetry. Sensory: No sensory deficit. Motor: No weakness. Psychiatric:         Mood and Affect: Mood normal.           DIFFERENTIAL DIAGNOSIS / MDM     The patient's recent history and my physical exam with patient could benefit from bilateral Dopplers of the lower leg to rule out any DVTs to be causing her leg pain.  patient states that her mental status and overall health is slowly improved since Wednesday and that she follows up with neurology on the 11th and I feel that there is need for extensive work-up at this time he was recently cleared with a EEG, EKG, MRI, and cardiac echo. Attending to complete all remaining care, diagnosis and disposition for this patient. We discussed this patient prior to my departure.   My note will be refreshed to reflect these details, though I was not actively involved in this patient's care following the time stamp. PLAN (LABS / IMAGING / EKG):  Orders Placed This Encounter   Procedures    US DUP LOWER EXTREMITY RIGHT DESEAN    US DUP LOWER EXTREMITY LEFT DESEAN       MEDICATIONS ORDERED:  No orders of the defined types were placed in this encounter. Controlled Substances Monitoring:     DIAGNOSTIC RESULTS     EKG: All EKG's are interpreted by the Emergency Department Physician who either signs or Co-signs this chart in the absenceof a cardiologist.        RADIOLOGY: All images are read by the radiologist and their interpretations are reviewed. US DUP LOWER EXTREMITY RIGHT DESEAN    (Results Pending)   US DUP LOWER EXTREMITY LEFT DESEAN    (Results Pending)       No results found. LABS:  No results found for this visit on 10/01/21. Jacklyn YANG Roger [address] COURSE     ED Course as of Oct 01 2203   Fri Oct 01, 2021   2203 Attending to complete all remaining care, diagnosis and disposition for this patient. We discussed this patient prior to my departure. My note will be refreshed to reflect these details, though I was not actively involved in this patient's care following the time stamp. [TM]      ED Course User Index  [TM] ESEQUIEL Velez - CNP        Vitals:    Vitals:    10/01/21 1955 10/01/21 2006 10/01/21 2021 10/01/21 2051   BP: (!) 136/99 125/84 (!) 125/92 122/88   Pulse: 91      Resp: 16      Temp: 97.8 °F (36.6 °C)      TempSrc: Oral      SpO2: 100% 100% 100% 100%   Weight: 87.5 kg (193 lb)      Height: 4' 11\" (1.499 m)        -------------------------  BP: 122/88, Temp: 97.8 °F (36.6 °C), Pulse: 91, Resp: 16      RE-EVALUATION:  See ED Course notes above. The patient and/or family and I have discussed the diagnosis and risks, and we agree with discharging home to follow-up with their pertinent providers. The patient appears stable for discharge and has been instructed to return immediately for new concerning symptoms or if the symptoms worsen in any way.  The patient understands that at this time there is no evidence for a more malignant underlying process, but the patient also understands that early in the process of an illness or injury, an emergency department workup can be falsely reassuring. Routine discharge counseling was given, and the patient understands that worsening, changing or persistent symptoms should prompt an immediate call or follow up with their primary physician or return to the emergency department. I have reviewed the disposition diagnosis with the patient and or their family/guardian. I have answered their questions and given discharge instructions. They voiced understanding of these instructions and did not have any further questions or complaints. This patient was seen by the attending physician and they agreed with the assessment and plan. CONSULTS:  None    PROCEDURES:  None    FINAL IMPRESSION      No diagnosis found. DISPOSITION / PLAN     CONDITION ON DISPOSITION:   Stable     PATIENT REFERRED TO:  No follow-up provider specified.     DISCHARGE MEDICATIONS:  New Prescriptions    No medications on file       ESEQUIEL Mccain - [address] [facility]   Emergency Medicine Nurse Practitioner    (Please note that portions of this note were completed with a voice recognition program.  Efforts were made to edit the dictations but occasionally words aremis-transcribed.)     ESEQUIEL Mccain CNP  10/01/21 8218

## 2021-11-12 ENCOUNTER — TELEPHONE (OUTPATIENT)
Dept: ONCOLOGY | Age: 56
End: 2021-11-12

## 2021-11-12 ENCOUNTER — OFFICE VISIT (OUTPATIENT)
Dept: ONCOLOGY | Age: 56
End: 2021-11-12
Payer: COMMERCIAL

## 2021-11-12 VITALS
TEMPERATURE: 96.3 F | HEART RATE: 84 BPM | SYSTOLIC BLOOD PRESSURE: 120 MMHG | BODY MASS INDEX: 37.14 KG/M2 | WEIGHT: 183.9 LBS | DIASTOLIC BLOOD PRESSURE: 80 MMHG

## 2021-11-12 DIAGNOSIS — D47.2 MGUS (MONOCLONAL GAMMOPATHY OF UNKNOWN SIGNIFICANCE): Primary | ICD-10-CM

## 2021-11-12 PROCEDURE — 99211 OFF/OP EST MAY X REQ PHY/QHP: CPT | Performed by: INTERNAL MEDICINE

## 2021-11-12 PROCEDURE — 99214 OFFICE O/P EST MOD 30 MIN: CPT | Performed by: INTERNAL MEDICINE

## 2021-11-12 NOTE — TELEPHONE ENCOUNTER
AVS from 11/12/21    RV 6 moths with labs before RV    RV scheduled 5/16/21 @ 3pm    PT will have labs drawn one week prior to return visit     PT was given AVS and an appt schedule    Electronically signed by Margarita Jameson on 11/12/2021 at 3:56 PM

## 2021-11-13 NOTE — PROGRESS NOTES
_           Chief Complaint   Patient presents with    Follow-up     review status of disease    Discuss Labs     DIAGNOSIS:       Currently Normocytic anemia  Past Chronic history of macrocytic anemia  Paraproteinemia, likely MGUS  Chronic renal insufficiency, stage IV      CURRENT THERAPY:         Patient seen discuss results of the lab test and further lab work up  Close monitoring for MGUS    BRIEF CASE HISTORY:      Ms. Julia Guzmán is a very pleasant 64 y.o. female with history of hyperlipidemia and hypertension diagnosed more than 15 years ago. Patient was also found to have chronic renal insufficiency. Kidney function was deteriorating. She was recently referred and evaluated by nephrology. Further workup for underlying etiology showed abnormal immunoglobulins levels. She is referred for further evaluation. Patient has no fever or night sweats. No back pain or bone aches. No weight loss or decreased appetite. No palpable lymph nodes. No history of liver disease. No other complaints. Patient quit smoking 25 years ago. He smoked about 10 years. Denies alcohol drinking. INTERIM HISTORY:   The patient seen for follow-up abnormal immunoglobulins levels related to MGUS based on Bone marrow test.  Clinically patient is stable. No weakness or fatigue. No fever or night sweats. No weight loss or decreased appetite. No palpable lymph nodes. PAST MEDICAL HISTORY: has a past medical history of Chronic kidney disease, Hyperlipidemia, Hypertension, Hyponatremia, and Vitamin D deficiency. PAST SURGICAL HISTORY: has a past surgical history that includes Bunionectomy (Bilateral). CURRENT MEDICATIONS:  has a current medication list which includes the following prescription(s): cholecalciferol, aspirin, vitamin d, bumetanide, sodium bicarbonate, and simvastatin.     ALLERGIES:  is allergic to pressure 120/80, pulse 84, temperature 96.3 °F (35.7 °C), temperature source Temporal, weight 183 lb 14.4 oz (83.4 kg). HEENT:  Eyes are normal. Ears, nose and throat are normal.  Neck: Supple. No lymph node enlargement. No thyroid enlargement. Trachea is centrally located. Chest:  Clear to auscultation. No wheezes or crepitations. Heart: Regular sinus rhythm. Abdomen: Soft, nontender. No hepatosplenomegaly. No masses. Extremities:  With no edema. Lymph Nodes:  No cervical, axillary or inguinal lymph node enlargement. Neurologic:  Conscious and oriented. No focal neurological deficits. Psychosocial: No depression, anxiety or stress. Skin: No rashes, bruises or ecchymoses. Review of Diagnostic data:     CBC  Component Name    Hospital Encounter on 10/29/2018  [address] [place] [address] [facility]\")' href=\"[email or web address]. [email or web address]">10/29/2018   Hospital En[facility]31/2018   [facility]\")' href=\"epic://request1. [email or web address]">5/71/5385   Hospital Encounter on 8/2/2018  [address] [place] [address] [facility]\")' href=\"[email or web address]. [email or web address]">8/2/2018 1/4/2013 12/5/2012 12/1/2012     7.9 9.1 6.9 2.7 (L) 2.5 (L) 3.1 (L)   3.01 (L) 3.09 (L) 3.37 (L) 2.87 (L) 2.99 (L) 2.90 (L)   9.9 (L) 10.8 (L) 11.8 10.9 (L) 11.1 (L) 10.7 (L)   30.0 (L) 32.0 (L) 35.2 30.9 (L) 32.1 (L) 31.3 (L)   100 104 (H) 104 (H) 108 (H) 107 (H) 108 (H)   33.0 35.1 (H) 34.9 (H) 37.9 (H) 37.0 (H) 37.0 (H)   33.2 33.9 33.4 35.2 34.4 34.2   12.8 13.4 13.5 13.9 14.1 13.7   263 273 268 123 (L) 149 (L) 161   9.6 9.1 9.4 8.1 7.7     IMMUNOGLOBULINS  Component Name    Hospital Encounter on 10/29/2018  [address] [place] [address]\")' href=\"[email or web address]. [email or web address]">10/29/2018     540 (H)   66   1,036   5.54 (H)   11.43 (H)   0.48   SEE SEPARATE REPORT   IgA   IgM   IgG   Free Kappa Lt Chains   Free Lambda Lt Chains Free verona/lambda ratio   Immune profile inter     Bone marrow test December 3, 2018: Final Diagnosis   Peripheral Blood:         Normocytic anemia, hypoproliferative. Bone Marrow Aspirate, Clot Section and Biopsy:         Maturing trilineage hematopoiesis, normal cellular bone marrow.         Adequate storage iron.         Plasma cells, monoclonal, 3% (lambda).         Diagnosis Comment     Electronic health record is reviewed.  Noted in the clinical   information is a faint IGA lambda paraprotein.  Quantitation is not   available within the EHR.  Recommend serum and urine protein   electrophrenic studies with immunofixation if not already performed   for quantitation of paraprotein.        IMPRESSION:   Past Chronic history of macrocytic anemia  Paraproteinemia, likely MGUS  Chronic renal insufficiency, stage IV      PLAN: All above were reviewed and discussed with the patient. I explained the significance of these abnormalities related to immunoglobulins. Bone marrow test was reviewed and discussed. MGUS. repeated labs showed stable immunoglobulins level. No significant changes. We recommended to continue monitoring for IgA and light chain immunoglobulins. Labs will be repeated in 6 months. Patient may benefit from treatment with Aranesp for anemia of chronic renal insufficiency for hemoglobin below 10. Patient's questions were answered to the best of her satisfaction and she verbalized full understanding and agreement.

## 2022-08-15 ENCOUNTER — OFFICE VISIT (OUTPATIENT)
Dept: ONCOLOGY | Age: 57
End: 2022-08-15
Payer: COMMERCIAL

## 2022-08-15 ENCOUNTER — TELEPHONE (OUTPATIENT)
Dept: ONCOLOGY | Age: 57
End: 2022-08-15

## 2022-08-15 VITALS
DIASTOLIC BLOOD PRESSURE: 80 MMHG | TEMPERATURE: 97.1 F | BODY MASS INDEX: 39.83 KG/M2 | HEART RATE: 103 BPM | SYSTOLIC BLOOD PRESSURE: 130 MMHG | WEIGHT: 197.2 LBS

## 2022-08-15 DIAGNOSIS — D47.2 MGUS (MONOCLONAL GAMMOPATHY OF UNKNOWN SIGNIFICANCE): Primary | ICD-10-CM

## 2022-08-15 DIAGNOSIS — L98.9 SKIN LESION: ICD-10-CM

## 2022-08-15 PROCEDURE — 99211 OFF/OP EST MAY X REQ PHY/QHP: CPT | Performed by: INTERNAL MEDICINE

## 2022-08-15 PROCEDURE — 99214 OFFICE O/P EST MOD 30 MIN: CPT | Performed by: INTERNAL MEDICINE

## 2022-08-15 NOTE — PROGRESS NOTES
_           Chief Complaint   Patient presents with    Follow-up     Review status of disease    Other     Spot on my nose on left side      DIAGNOSIS:       Currently Normocytic anemia  Past Chronic history of macrocytic anemia  Paraproteinemia, likely MGUS  Chronic renal insufficiency, stage IV      CURRENT THERAPY:         Patient seen discuss results of the lab test and further lab work up  Close monitoring for MGUS    BRIEF CASE HISTORY:      Ms. Genia Ochoa is a very pleasant 62 y.o. female with history of hyperlipidemia and hypertension diagnosed more than 15 years ago. Patient was also found to have chronic renal insufficiency. Kidney function was deteriorating. She was recently referred and evaluated by nephrology. Further workup for underlying etiology showed abnormal immunoglobulins levels. She is referred for further evaluation. Patient has no fever or night sweats. No back pain or bone aches. No weight loss or decreased appetite. No palpable lymph nodes. No history of liver disease. No other complaints. Patient quit smoking 25 years ago. He smoked about 10 years. Denies alcohol drinking. INTERIM HISTORY:   The patient seen for follow-up abnormal immunoglobulins levels related to MGUS based on Bone marrow test.  Clinically patient is stable. No weakness or fatigue. No fever or night sweats. No weight loss or decreased appetite. No palpable lymph nodes. PAST MEDICAL HISTORY: has a past medical history of Chronic kidney disease, Hyperlipidemia, Hypertension, Hyponatremia, and Vitamin D deficiency. PAST SURGICAL HISTORY: has a past surgical history that includes Bunionectomy (Bilateral). CURRENT MEDICATIONS:  has a current medication list which includes the following prescription(s): cholecalciferol, aspirin, bumetanide, sodium bicarbonate, simvastatin, and vitamin d.     ALLERGIES: is allergic to ampicillin. FAMILY HISTORY: Grandfather had lung cancer. Grandfather had liver cancer. Otherwise negative for any hematological or oncological conditions. SOCIAL HISTORY:  reports that she has quit smoking. She has never used smokeless tobacco. She reports that she does not drink alcohol and does not use drugs. REVIEW OF SYSTEMS:     General: No weakness or fatigue. No unanticipated weight loss or decreased appetite. No fever or chills. Eyes: No blurred vision, eye pain or double vision. Ears: No hearing problems or drainage. No tinnitus. Throat: No sore throat, problems with swallowing or dysphagia. Respiratory: No cough, sputum or hemoptysis. No shortness of breath. No pleuritic chest pain. Cardiovascular: No chest pain, orthopnea or PND. No lower extremity edema. No palpitation. Gastrointestinal: No problems with swallowing. No abdominal pain or bloating. No nausea or vomiting. No diarrhea or constipation. No GI bleeding. Genitourinary: No dysuria, hematuria, frequency or urgency. Musculoskeletal: No muscle aches or pains. No limitation of movement. No back pain. No gait disturbance, No joint complaints. Dermatologic: No skin rashes or pruritus. No skin lesions or discolorations. Psychiatric: No depression, anxiety, or stress or signs of schizophrenia. No change in mood or affect. Hematologic: No history of bleeding tendency. No bruises or ecchymosis. No history of clotting problems. Infectious disease: No fever, chills or frequent infections. Endocrine: No problems with opacity. No polydipsia or polyuria. No temperature intolerance. Neurologic: No headaches or dizziness. No weakness or numbness of the extremities. No changes in balance, coordination,  memory, mentation, behavior. Allergic/Immunologic: No nasal congestion or hives. No repeated infections. PHYSICAL EXAM:  The patient is not in acute distress.   Vital signs: Blood pressure 130/80, Free Lambda Lt Chains   Free verona/lambda ratio   Immune profile inter     Bone marrow test December 3, 2018: Final Diagnosis   Peripheral Blood:         Normocytic anemia, hypoproliferative. Bone Marrow Aspirate, Clot Section and Biopsy:         Maturing trilineage hematopoiesis, normal cellular bone marrow. Adequate storage iron. Plasma cells, monoclonal, 3% (lambda). Diagnosis Comment     Electronic health record is reviewed. Noted in the clinical   information is a faint IGA lambda paraprotein. Quantitation is not   available within the EHR. Recommend serum and urine protein   electrophrenic studies with immunofixation if not already performed   for quantitation of paraprotein. IMPRESSION:   Past Chronic history of macrocytic anemia  Paraproteinemia, likely MGUS  Chronic renal insufficiency, stage IV  Skin lesion over the left nostril. PLAN: All above were reviewed and discussed with the patient. I explained the significance of these abnormalities related to immunoglobulins. Bone marrow test was reviewed and discussed. MGUS. repeated labs showed stable immunoglobulins level. No significant changes. We recommended to continue monitoring for IgA and light chain immunoglobulins. Labs will be repeated in 6 months. Patient may benefit from treatment with Aranesp for anemia of chronic renal insufficiency for hemoglobin below 10. Will refer to dermatology for further management of the suspicious skin lesion in the left nostril. Possible basal cell carcinoma. Patient's questions were answered to the best of her satisfaction and she verbalized full understanding and agreement.

## 2022-08-15 NOTE — TELEPHONE ENCOUNTER
RV 6 months with labs before RV  Refer to Dr [facility] for nose lesion    RV scheduled 2/13/22 @ 2:30pm    Pt will have labs drawn one week prior to RV     Referral faxed to [phone], confirmation received    PT was given AVS and appt schedule    Electronically signed by Lodema Nageotte on 8/15/2022 at 12:48 PM

## 2023-02-13 ENCOUNTER — OFFICE VISIT (OUTPATIENT)
Dept: ONCOLOGY | Age: 58
End: 2023-02-13
Payer: COMMERCIAL

## 2023-02-13 VITALS
WEIGHT: 208 LBS | SYSTOLIC BLOOD PRESSURE: 130 MMHG | HEART RATE: 80 BPM | DIASTOLIC BLOOD PRESSURE: 86 MMHG | TEMPERATURE: 97.1 F | BODY MASS INDEX: 42.01 KG/M2

## 2023-02-13 DIAGNOSIS — D47.2 MGUS (MONOCLONAL GAMMOPATHY OF UNKNOWN SIGNIFICANCE): Primary | ICD-10-CM

## 2023-02-13 PROCEDURE — 99211 OFF/OP EST MAY X REQ PHY/QHP: CPT | Performed by: INTERNAL MEDICINE

## 2023-02-13 PROCEDURE — 99214 OFFICE O/P EST MOD 30 MIN: CPT | Performed by: INTERNAL MEDICINE

## 2023-02-13 RX ORDER — CALCITRIOL 0.25 UG/1
0.25 CAPSULE, LIQUID FILLED ORAL DAILY
COMMUNITY

## 2023-02-13 NOTE — PROGRESS NOTES
_           Chief Complaint   Patient presents with    Follow-up     Review status of disease    Discuss Labs     DIAGNOSIS:       Currently Normocytic anemia  Past Chronic history of macrocytic anemia  Paraproteinemia, likely MGUS  Chronic renal insufficiency, stage IV      CURRENT THERAPY:         Patient seen discuss results of the lab test and further lab work up  Close monitoring for MGUS    BRIEF CASE HISTORY:      Ms. Sabina Matias is a very pleasant 62 y.o. female with history of hyperlipidemia and hypertension diagnosed more than 15 years ago. Patient was also found to have chronic renal insufficiency. Kidney function was deteriorating. She was recently referred and evaluated by nephrology. Further workup for underlying etiology showed abnormal immunoglobulins levels. She is referred for further evaluation. Patient has no fever or night sweats. No back pain or bone aches. No weight loss or decreased appetite. No palpable lymph nodes. No history of liver disease. No other complaints. Patient quit smoking 25 years ago. He smoked about 10 years. Denies alcohol drinking. INTERIM HISTORY:   The patient seen for follow-up abnormal immunoglobulins levels related to MGUS based on Bone marrow test.  Clinically patient is stable. No weakness or fatigue. No fever or night sweats. No weight loss or decreased appetite. No palpable lymph nodes. PAST MEDICAL HISTORY: has a past medical history of Chronic kidney disease, Hyperlipidemia, Hypertension, Hyponatremia, and Vitamin D deficiency. PAST SURGICAL HISTORY: has a past surgical history that includes Bunionectomy (Bilateral). CURRENT MEDICATIONS:  has a current medication list which includes the following prescription(s): calcitriol, aspirin, bumetanide, sodium bicarbonate, and simvastatin. ALLERGIES:  is allergic to ampicillin.     FAMILY HISTORY: Grandfather had lung cancer. Grandfather had liver cancer. Otherwise negative for any hematological or oncological conditions. SOCIAL HISTORY:  reports that she has quit smoking. She has never used smokeless tobacco. She reports that she does not drink alcohol and does not use drugs. REVIEW OF SYSTEMS:     General: No weakness or fatigue. No unanticipated weight loss or decreased appetite. No fever or chills. Eyes: No blurred vision, eye pain or double vision. Ears: No hearing problems or drainage. No tinnitus. Throat: No sore throat, problems with swallowing or dysphagia. Respiratory: No cough, sputum or hemoptysis. No shortness of breath. No pleuritic chest pain. Cardiovascular: No chest pain, orthopnea or PND. No lower extremity edema. No palpitation. Gastrointestinal: No problems with swallowing. No abdominal pain or bloating. No nausea or vomiting. No diarrhea or constipation. No GI bleeding. Genitourinary: No dysuria, hematuria, frequency or urgency. Musculoskeletal: No muscle aches or pains. No limitation of movement. No back pain. No gait disturbance, No joint complaints. Dermatologic: No skin rashes or pruritus. No skin lesions or discolorations. Psychiatric: No depression, anxiety, or stress or signs of schizophrenia. No change in mood or affect. Hematologic: No history of bleeding tendency. No bruises or ecchymosis. No history of clotting problems. Infectious disease: No fever, chills or frequent infections. Endocrine: No problems with opacity. No polydipsia or polyuria. No temperature intolerance. Neurologic: No headaches or dizziness. No weakness or numbness of the extremities. No changes in balance, coordination,  memory, mentation, behavior. Allergic/Immunologic: No nasal congestion or hives. No repeated infections. PHYSICAL EXAM:  The patient is not in acute distress.   Vital signs: Blood pressure 130/86, pulse 80, temperature 97.1 °F (36.2 °C), temperature source Temporal, weight 208 lb (94.3 kg). HEENT:  Eyes are normal. Ears, nose and throat are normal.  Neck: Supple. No lymph node enlargement. No thyroid enlargement. Trachea is centrally located. Chest:  Clear to auscultation. No wheezes or crepitations. Heart: Regular sinus rhythm. Abdomen: Soft, nontender. No hepatosplenomegaly. No masses. Extremities:  With no edema. Lymph Nodes:  No cervical, axillary or inguinal lymph node enlargement. Neurologic:  Conscious and oriented. No focal neurological deficits. Psychosocial: No depression, anxiety or stress. Skin: No rashes, bruises or ecchymoses. Small skin lesion on the left nostril. Excised by dermatology. Review of Diagnostic data:     CBC  Component Name    Hospital Encounter on 10/29/2018   [facility]\")' href=\"[email or web address]. [email or web address]/\">10/29/2018   Hospital Encounter on 8/31/2018   [facility]\")' href=\"epic://request1. [email or web address]/\">6/91/7152   Hospital Encounter on 8/2/2018   [facility]\")' href=\"epic://request1. [email or web address]">8/2/2018 1/4/2013 12/5/2012 12/1/2012     7.9 9.1 6.9 2.7 (L) 2.5 (L) 3.1 (L)   3.01 (L) 3.09 (L) 3.37 (L) 2.87 (L) 2.99 (L) 2.90 (L)   9.9 (L) 10.8 (L) 11.8 10.9 (L) 11.1 (L) 10.7 (L)   30.0 (L) 32.0 (L) 35.2 30.9 (L) 32.1 (L) 31.3 (L)   100 104 (H) 104 (H) 108 (H) 107 (H) 108 (H)   33.0 35.1 (H) 34.9 (H) 37.9 (H) 37.0 (H) 37.0 (H)   33.2 33.9 33.4 35.2 34.4 34.2   12.8 13.4 13.5 13.9 14.1 13.7   263 273 268 123 (L) 149 (L) 161   9.6 9.1 9.4 8.1 7.7     IMMUNOGLOBULINS  Component Name    Hospital Encounter on 10/29/2018  [address] [place] [address]\")' href=\"[email or web address]. [email or web address]/\">10/29/2018     540 (H)   66   1,036   5.54 (H)   11.43 (H)   0.48   SEE SEPARATE REPORT   IgA   IgM   IgG   Free Kappa Lt Chains   Free Lambda Lt Chains Free verona/lambda ratio   Immune profile inter     Bone marrow test December 3, 2018: Final Diagnosis   Peripheral Blood:         Normocytic anemia, hypoproliferative. Bone Marrow Aspirate, Clot Section and Biopsy:         Maturing trilineage hematopoiesis, normal cellular bone marrow. Adequate storage iron. Plasma cells, monoclonal, 3% (lambda). Diagnosis Comment     Electronic health record is reviewed. Noted in the clinical   information is a faint IGA lambda paraprotein. Quantitation is not   available within the EHR. Recommend serum and urine protein   electrophrenic studies with immunofixation if not already performed   for quantitation of paraprotein. IMPRESSION:   Past Chronic history of macrocytic anemia  Paraproteinemia, likely MGUS  Chronic renal insufficiency, stage IV  Skin lesion over the left nostril. PLAN: All above were reviewed and discussed with the patient. I explained the significance of these abnormalities related to immunoglobulins. Bone marrow test was reviewed and discussed. MGUS. repeated labs showed stable immunoglobulins level. No significant changes. We recommended to continue monitoring for IgA and light chain immunoglobulins. Labs will be repeated in 6 months. Patient may benefit from treatment with Aranesp for anemia of chronic renal insufficiency for hemoglobin below 10. Patient was referred to dermatology for further management of the suspicious skin lesion in the left nostril. It was excised and she was found to have basal cell carcinoma. Patient's questions were answered to the best of her satisfaction and she verbalized full understanding and agreement.

## 2023-02-14 ENCOUNTER — TELEPHONE (OUTPATIENT)
Dept: ONCOLOGY | Age: 58
End: 2023-02-14

## 2023-02-14 NOTE — TELEPHONE ENCOUNTER
AVS from 2/13/23      RV 6 months with labs before RV    Rv scheduled for 8/27 @ 2:30 pm with labs Pt will have labs drawn one week prior to RV    Pt was given AVS and appointment schedule    Electronically signed by John Boss on 2/14/2023 at 7:56 AM

## 2023-08-16 DIAGNOSIS — D47.2 MGUS (MONOCLONAL GAMMOPATHY OF UNKNOWN SIGNIFICANCE): Primary | ICD-10-CM

## 2023-08-21 ENCOUNTER — HOSPITAL ENCOUNTER (OUTPATIENT)
Age: 58
Discharge: HOME | End: 2023-08-21

## 2023-08-21 ENCOUNTER — OFFICE VISIT (OUTPATIENT)
Dept: ONCOLOGY | Age: 58
End: 2023-08-21
Payer: COMMERCIAL

## 2023-08-21 ENCOUNTER — TELEPHONE (OUTPATIENT)
Dept: ONCOLOGY | Age: 58
End: 2023-08-21

## 2023-08-21 VITALS
DIASTOLIC BLOOD PRESSURE: 79 MMHG | SYSTOLIC BLOOD PRESSURE: 117 MMHG | HEART RATE: 83 BPM | BODY MASS INDEX: 42.9 KG/M2 | WEIGHT: 212.4 LBS | TEMPERATURE: 97 F

## 2023-08-21 DIAGNOSIS — N18.31 ANEMIA OF CHRONIC RENAL FAILURE, STAGE 3A (HCC): ICD-10-CM

## 2023-08-21 DIAGNOSIS — D63.1 ANEMIA OF CHRONIC RENAL FAILURE, STAGE 3A (HCC): ICD-10-CM

## 2023-08-21 DIAGNOSIS — D47.2 MGUS (MONOCLONAL GAMMOPATHY OF UNKNOWN SIGNIFICANCE): Primary | ICD-10-CM

## 2023-08-21 PROCEDURE — 99214 OFFICE O/P EST MOD 30 MIN: CPT | Performed by: INTERNAL MEDICINE

## 2023-08-21 PROCEDURE — 99211 OFF/OP EST MAY X REQ PHY/QHP: CPT | Performed by: INTERNAL MEDICINE

## 2023-08-21 NOTE — TELEPHONE ENCOUNTER
AVS from 8/21/23    RV 6 months with labs (CBC, CMP, Immunoglobulins level, immunofixation and kappa/ lambda light chains) before RV     RV scheduled for 2/19/24 @3:00pm       Pt will have labs drawn one week prior to RV    PT was given AVS and appt schedule    Electronically signed by Saúl Lorenzana on 8/21/2023 at 3:27 PM

## 2023-08-21 NOTE — PATIENT INSTRUCTIONS
RV 6 months with labs (CBC, CMP, Immunoglobulins level, immunofixation and kappa/ lambda light chains) before RV

## 2023-08-21 NOTE — PROGRESS NOTES
_           Chief Complaint   Patient presents with    Follow-up     Review status of disease    Discuss Labs     DIAGNOSIS:       Currently Normocytic anemia  Past Chronic history of macrocytic anemia  Paraproteinemia, likely MGUS  Chronic renal insufficiency, stage IV      CURRENT THERAPY:         Patient seen discuss results of the lab test and further lab work up  Close monitoring for MGUS    BRIEF CASE HISTORY:      Ms. Nate Sapp is a very pleasant 62 y.o. female with history of hyperlipidemia and hypertension diagnosed more than 15 years ago. Patient was also found to have chronic renal insufficiency. Kidney function was deteriorating. She was recently referred and evaluated by nephrology. Further workup for underlying etiology showed abnormal immunoglobulins levels. She is referred for further evaluation. Patient has no fever or night sweats. No back pain or bone aches. No weight loss or decreased appetite. No palpable lymph nodes. No history of liver disease. No other complaints. Patient quit smoking 25 years ago. He smoked about 10 years. Denies alcohol drinking. INTERIM HISTORY:   The patient seen for follow-up abnormal immunoglobulins levels related to MGUS based on Bone marrow test.  Clinically patient is stable. No weakness or fatigue. No fever or night sweats. No weight loss or decreased appetite. No palpable lymph nodes. PAST MEDICAL HISTORY: has a past medical history of Chronic kidney disease, Hyperlipidemia, Hypertension, Hyponatremia, and Vitamin D deficiency. PAST SURGICAL HISTORY: has a past surgical history that includes Bunionectomy (Bilateral). CURRENT MEDICATIONS:  has a current medication list which includes the following prescription(s): calcitriol, aspirin, bumetanide, sodium bicarbonate, and simvastatin. ALLERGIES:  is allergic to ampicillin.     FAMILY

## 2024-06-05 DIAGNOSIS — D47.2 MGUS (MONOCLONAL GAMMOPATHY OF UNKNOWN SIGNIFICANCE): Primary | ICD-10-CM

## 2024-06-05 DIAGNOSIS — N18.31 ANEMIA OF CHRONIC RENAL FAILURE, STAGE 3A (HCC): ICD-10-CM

## 2024-06-05 DIAGNOSIS — D63.1 ANEMIA OF CHRONIC RENAL FAILURE, STAGE 3A (HCC): ICD-10-CM

## 2024-06-07 ENCOUNTER — OFFICE VISIT (OUTPATIENT)
Dept: ONCOLOGY | Age: 59
End: 2024-06-07
Payer: COMMERCIAL

## 2024-06-07 ENCOUNTER — TELEPHONE (OUTPATIENT)
Dept: ONCOLOGY | Age: 59
End: 2024-06-07

## 2024-06-07 VITALS
DIASTOLIC BLOOD PRESSURE: 82 MMHG | HEART RATE: 73 BPM | SYSTOLIC BLOOD PRESSURE: 133 MMHG | TEMPERATURE: 96.8 F | OXYGEN SATURATION: 99 % | WEIGHT: 217.9 LBS | BODY MASS INDEX: 44.01 KG/M2 | RESPIRATION RATE: 18 BRPM

## 2024-06-07 DIAGNOSIS — D47.2 MGUS (MONOCLONAL GAMMOPATHY OF UNKNOWN SIGNIFICANCE): Primary | ICD-10-CM

## 2024-06-07 PROCEDURE — 99211 OFF/OP EST MAY X REQ PHY/QHP: CPT | Performed by: INTERNAL MEDICINE

## 2024-06-07 PROCEDURE — 99214 OFFICE O/P EST MOD 30 MIN: CPT | Performed by: INTERNAL MEDICINE

## 2024-06-07 RX ORDER — MAGNESIUM GLUCONATE 27 MG(500)
500 TABLET ORAL DAILY
COMMUNITY

## 2024-06-07 NOTE — TELEPHONE ENCOUNTER
Instructions   from Dr. Kathleen Ferrara MD    RV 6 months with labs (CBC, CMP, Immunoglobulins level, immunofixation and kappa/ lambda light chains) before RV      RV 1/10/25 at 10 am with labs to be done the week before.

## 2024-06-08 NOTE — PROGRESS NOTES
Lambda Lt Chains   Free verona/lambda ratio   Immune profile inter     Bone marrow test December 3, 2018:  Final Diagnosis   Peripheral Blood:         Normocytic anemia, hypoproliferative.     Bone Marrow Aspirate, Clot Section and Biopsy:         Maturing trilineage hematopoiesis, normal cellular bone marrow.         Adequate storage iron.         Plasma cells, monoclonal, 3% (lambda).         Diagnosis Comment     Electronic health record is reviewed.  Noted in the clinical   information is a faint IGA lambda paraprotein.  Quantitation is not   available within the EHR.  Recommend serum and urine protein   electrophrenic studies with immunofixation if not already performed   for quantitation of paraprotein.        IMPRESSION:   Past Chronic history of macrocytic anemia  Paraproteinemia, likely MGUS  Chronic renal insufficiency, stage IV  Skin lesion over the left nostril.      PLAN: All above were reviewed and discussed with the patient. I explained the significance of these abnormalities related to immunoglobulins.    Bone marrow test was reviewed and discussed.  MGUS. repeated labs showed stable immunoglobulins level. No significant changes.  We recommended to continue monitoring for IgA and light chain immunoglobulins.    Labs will be repeated in 6 months.  Patient may benefit from treatment with Aranesp for anemia of chronic renal insufficiency for hemoglobin below 10.  Patient was referred to dermatology for further management of the suspicious skin lesion in the left nostril.  It was excised and she was found to have basal cell carcinoma.   Patient's questions were answered to the best of her satisfaction and she verbalized full understanding and agreement.

## 2025-01-10 ENCOUNTER — TELEPHONE (OUTPATIENT)
Dept: ONCOLOGY | Age: 60
End: 2025-01-10

## 2025-01-10 ENCOUNTER — OFFICE VISIT (OUTPATIENT)
Dept: ONCOLOGY | Age: 60
End: 2025-01-10
Payer: COMMERCIAL

## 2025-01-10 VITALS
DIASTOLIC BLOOD PRESSURE: 95 MMHG | BODY MASS INDEX: 45.65 KG/M2 | WEIGHT: 226 LBS | RESPIRATION RATE: 18 BRPM | TEMPERATURE: 96.8 F | SYSTOLIC BLOOD PRESSURE: 136 MMHG | OXYGEN SATURATION: 94 % | HEART RATE: 96 BPM

## 2025-01-10 DIAGNOSIS — D47.2 MGUS (MONOCLONAL GAMMOPATHY OF UNKNOWN SIGNIFICANCE): ICD-10-CM

## 2025-01-10 DIAGNOSIS — N18.31 ANEMIA OF CHRONIC RENAL FAILURE, STAGE 3A (HCC): Primary | ICD-10-CM

## 2025-01-10 DIAGNOSIS — D47.2 MGUS (MONOCLONAL GAMMOPATHY OF UNKNOWN SIGNIFICANCE): Primary | ICD-10-CM

## 2025-01-10 DIAGNOSIS — D63.1 ANEMIA OF CHRONIC RENAL FAILURE, STAGE 3A (HCC): Primary | ICD-10-CM

## 2025-01-10 PROCEDURE — 99214 OFFICE O/P EST MOD 30 MIN: CPT | Performed by: INTERNAL MEDICINE

## 2025-01-10 NOTE — PROGRESS NOTES
IgG   Free Kappa Lt Chains   Free Lambda Lt Chains   Free verona/lambda ratio   Immune profile inter     Bone marrow test December 3, 2018:  Final Diagnosis   Peripheral Blood:         Normocytic anemia, hypoproliferative.     Bone Marrow Aspirate, Clot Section and Biopsy:         Maturing trilineage hematopoiesis, normal cellular bone marrow.         Adequate storage iron.         Plasma cells, monoclonal, 3% (lambda).         Diagnosis Comment     Electronic health record is reviewed.  Noted in the clinical   information is a faint IGA lambda paraprotein.  Quantitation is not   available within the EHR.  Recommend serum and urine protein   electrophrenic studies with immunofixation if not already performed   for quantitation of paraprotein.        IMPRESSION:   Past Chronic history of macrocytic anemia  Paraproteinemia, likely MGUS  Chronic renal insufficiency, stage IV  Skin lesion over the left nostril.      PLAN: All above were reviewed and discussed with the patient. I explained the significance of these abnormalities related to immunoglobulins.    Bone marrow test was reviewed and discussed.  MGUS. repeated labs showed stable immunoglobulins level. No significant changes.  We recommended to continue monitoring for IgA and light chain immunoglobulins.    Labs will be repeated in 6 months.  Patient may benefit from treatment with Aranesp for anemia of chronic renal insufficiency for hemoglobin below 10.  Patient was referred to dermatology for further management of the suspicious skin lesion in the left nostril.  It was excised and she was found to have basal cell carcinoma.   Patient's questions were answered to the best of her satisfaction and she verbalized full understanding and agreement.

## 2025-01-10 NOTE — TELEPHONE ENCOUNTER
Instructions   from Dr. Kathleen Ferrara MD    RV 6 months with labs (CBC, CMP, Immunoglobulins level, immunofixation and kappa/ lambda light chains) before RV    Rv scheduled on 7/21/2025 @2:30; gave pt a copy of their labs  
same as pt

## 2025-01-17 DIAGNOSIS — D63.1 ANEMIA OF CHRONIC RENAL FAILURE, STAGE 3A (HCC): ICD-10-CM

## 2025-01-17 DIAGNOSIS — N18.31 ANEMIA OF CHRONIC RENAL FAILURE, STAGE 3A (HCC): ICD-10-CM

## 2025-01-17 DIAGNOSIS — D47.2 MGUS (MONOCLONAL GAMMOPATHY OF UNKNOWN SIGNIFICANCE): ICD-10-CM

## 2025-07-15 DIAGNOSIS — D47.2 MGUS (MONOCLONAL GAMMOPATHY OF UNKNOWN SIGNIFICANCE): Primary | ICD-10-CM

## 2025-08-25 ENCOUNTER — OFFICE VISIT (OUTPATIENT)
Age: 60
End: 2025-08-25
Payer: COMMERCIAL

## 2025-08-25 ENCOUNTER — TELEPHONE (OUTPATIENT)
Age: 60
End: 2025-08-25

## 2025-08-25 VITALS
SYSTOLIC BLOOD PRESSURE: 131 MMHG | RESPIRATION RATE: 15 BRPM | OXYGEN SATURATION: 98 % | DIASTOLIC BLOOD PRESSURE: 85 MMHG | WEIGHT: 217.6 LBS | HEART RATE: 89 BPM | TEMPERATURE: 97.1 F | BODY MASS INDEX: 43.95 KG/M2

## 2025-08-25 DIAGNOSIS — D47.2 MGUS (MONOCLONAL GAMMOPATHY OF UNKNOWN SIGNIFICANCE): Primary | ICD-10-CM

## 2025-08-25 PROCEDURE — 99214 OFFICE O/P EST MOD 30 MIN: CPT | Performed by: INTERNAL MEDICINE

## 2025-08-25 RX ORDER — DAPAGLIFLOZIN 10 MG/1
10 TABLET, FILM COATED ORAL DAILY
COMMUNITY
Start: 2025-07-30